# Patient Record
Sex: MALE | Race: WHITE | NOT HISPANIC OR LATINO | Employment: FULL TIME | URBAN - METROPOLITAN AREA
[De-identification: names, ages, dates, MRNs, and addresses within clinical notes are randomized per-mention and may not be internally consistent; named-entity substitution may affect disease eponyms.]

---

## 2024-04-23 ENCOUNTER — TELEPHONE (OUTPATIENT)
Dept: PRIMARY CARE | Facility: CLINIC | Age: 39
End: 2024-04-23
Payer: COMMERCIAL

## 2024-04-23 DIAGNOSIS — Z00.00 ROUTINE GENERAL MEDICAL EXAMINATION AT A HEALTH CARE FACILITY: ICD-10-CM

## 2024-04-23 NOTE — TELEPHONE ENCOUNTER
F/T CPE 04-30-24 TERA ([facility])    WILL PATIENT NEED LABS?    PATIENT CAN BE REACHED [phone]

## 2024-04-26 ENCOUNTER — LAB (OUTPATIENT)
Dept: LAB | Facility: LAB | Age: 39
End: 2024-04-26
Payer: COMMERCIAL

## 2024-04-26 DIAGNOSIS — Z00.00 ROUTINE GENERAL MEDICAL EXAMINATION AT A HEALTH CARE FACILITY: ICD-10-CM

## 2024-04-26 LAB
ALBUMIN SERPL BCP-MCNC: 4.9 G/DL (ref 3.4–5)
ALP SERPL-CCNC: 64 U/L (ref 33–120)
ALT SERPL W P-5'-P-CCNC: 21 U/L (ref 10–52)
ANION GAP SERPL CALC-SCNC: 12 MMOL/L (ref 10–20)
AST SERPL W P-5'-P-CCNC: 13 U/L (ref 9–39)
BASOPHILS # BLD AUTO: 0.06 X10*3/UL (ref 0–0.1)
BASOPHILS NFR BLD AUTO: 1.2 %
BILIRUB SERPL-MCNC: 0.7 MG/DL (ref 0–1.2)
BUN SERPL-MCNC: 18 MG/DL (ref 6–23)
CALCIUM SERPL-MCNC: 9.9 MG/DL (ref 8.6–10.3)
CHLORIDE SERPL-SCNC: 104 MMOL/L (ref 98–107)
CHOLEST SERPL-MCNC: 212 MG/DL (ref 0–199)
CHOLESTEROL/HDL RATIO: 4.6
CO2 SERPL-SCNC: 30 MMOL/L (ref 21–32)
CREAT SERPL-MCNC: 1.06 MG/DL (ref 0.5–1.3)
EGFRCR SERPLBLD CKD-EPI 2021: >90 ML/MIN/1.73M*2
EOSINOPHIL # BLD AUTO: 0.09 X10*3/UL (ref 0–0.7)
EOSINOPHIL NFR BLD AUTO: 1.9 %
ERYTHROCYTE [DISTWIDTH] IN BLOOD BY AUTOMATED COUNT: 12.9 % (ref 11.5–14.5)
GLUCOSE SERPL-MCNC: 97 MG/DL (ref 74–99)
HCT VFR BLD AUTO: 47.7 % (ref 41–52)
HDLC SERPL-MCNC: 45.6 MG/DL
HGB BLD-MCNC: 16 G/DL (ref 13.5–17.5)
IMM GRANULOCYTES # BLD AUTO: 0.01 X10*3/UL (ref 0–0.7)
IMM GRANULOCYTES NFR BLD AUTO: 0.2 % (ref 0–0.9)
LDLC SERPL CALC-MCNC: 151 MG/DL
LYMPHOCYTES # BLD AUTO: 1.27 X10*3/UL (ref 1.2–4.8)
LYMPHOCYTES NFR BLD AUTO: 26.3 %
MCH RBC QN AUTO: 28.8 PG (ref 26–34)
MCHC RBC AUTO-ENTMCNC: 33.5 G/DL (ref 32–36)
MCV RBC AUTO: 86 FL (ref 80–100)
MONOCYTES # BLD AUTO: 0.42 X10*3/UL (ref 0.1–1)
MONOCYTES NFR BLD AUTO: 8.7 %
NEUTROPHILS # BLD AUTO: 2.97 X10*3/UL (ref 1.2–7.7)
NEUTROPHILS NFR BLD AUTO: 61.7 %
NON HDL CHOLESTEROL: 166 MG/DL (ref 0–149)
NRBC BLD-RTO: 0 /100 WBCS (ref 0–0)
PLATELET # BLD AUTO: 269 X10*3/UL (ref 150–450)
POTASSIUM SERPL-SCNC: 3.9 MMOL/L (ref 3.5–5.3)
PROT SERPL-MCNC: 7.3 G/DL (ref 6.4–8.2)
RBC # BLD AUTO: 5.56 X10*6/UL (ref 4.5–5.9)
SODIUM SERPL-SCNC: 142 MMOL/L (ref 136–145)
TRIGL SERPL-MCNC: 77 MG/DL (ref 0–149)
VLDL: 15 MG/DL (ref 0–40)
WBC # BLD AUTO: 4.8 X10*3/UL (ref 4.4–11.3)

## 2024-04-26 PROCEDURE — 36415 COLL VENOUS BLD VENIPUNCTURE: CPT

## 2024-04-29 ENCOUNTER — LAB (OUTPATIENT)
Dept: LAB | Facility: LAB | Age: 39
End: 2024-04-29
Payer: COMMERCIAL

## 2024-04-29 DIAGNOSIS — Z00.00 ROUTINE GENERAL MEDICAL EXAMINATION AT A HEALTH CARE FACILITY: ICD-10-CM

## 2024-04-29 PROBLEM — H93.19 TINNITUS: Status: ACTIVE | Noted: 2024-04-29

## 2024-04-29 LAB
APPEARANCE UR: CLEAR
BILIRUB UR STRIP.AUTO-MCNC: NEGATIVE MG/DL
COLOR UR: YELLOW
GLUCOSE UR STRIP.AUTO-MCNC: NORMAL MG/DL
KETONES UR STRIP.AUTO-MCNC: NEGATIVE MG/DL
LEUKOCYTE ESTERASE UR QL STRIP.AUTO: NEGATIVE
MUCOUS THREADS #/AREA URNS AUTO: NORMAL /LPF
NITRITE UR QL STRIP.AUTO: NEGATIVE
PH UR STRIP.AUTO: 6 [PH]
PROT UR STRIP.AUTO-MCNC: NORMAL MG/DL
RBC # UR STRIP.AUTO: NEGATIVE /UL
RBC #/AREA URNS AUTO: NORMAL /HPF
SP GR UR STRIP.AUTO: 1.03
UROBILINOGEN UR STRIP.AUTO-MCNC: NORMAL MG/DL
WBC #/AREA URNS AUTO: NORMAL /HPF

## 2024-04-30 ENCOUNTER — OFFICE VISIT (OUTPATIENT)
Dept: PRIMARY CARE | Facility: CLINIC | Age: 39
End: 2024-04-30
Payer: COMMERCIAL

## 2024-04-30 VITALS
BODY MASS INDEX: 17.76 KG/M2 | OXYGEN SATURATION: 96 % | DIASTOLIC BLOOD PRESSURE: 64 MMHG | HEART RATE: 64 BPM | TEMPERATURE: 98 F | SYSTOLIC BLOOD PRESSURE: 114 MMHG | WEIGHT: 134 LBS | HEIGHT: 73 IN

## 2024-04-30 DIAGNOSIS — F41.9 ANXIETY AND DEPRESSION: Primary | ICD-10-CM

## 2024-04-30 DIAGNOSIS — F32.A ANXIETY AND DEPRESSION: Primary | ICD-10-CM

## 2024-04-30 PROCEDURE — 1036F TOBACCO NON-USER: CPT

## 2024-04-30 PROCEDURE — 99213 OFFICE O/P EST LOW 20 MIN: CPT

## 2024-04-30 ASSESSMENT — PAIN SCALES - GENERAL: PAINLEVEL: 0-NO PAIN

## 2024-04-30 ASSESSMENT — ANXIETY QUESTIONNAIRES
6. BECOMING EASILY ANNOYED OR IRRITABLE: NOT AT ALL
5. BEING SO RESTLESS THAT IT IS HARD TO SIT STILL: NOT AT ALL
GAD7 TOTAL SCORE: 11
2. NOT BEING ABLE TO STOP OR CONTROL WORRYING: NEARLY EVERY DAY
3. WORRYING TOO MUCH ABOUT DIFFERENT THINGS: NEARLY EVERY DAY
7. FEELING AFRAID AS IF SOMETHING AWFUL MIGHT HAPPEN: NOT AT ALL
4. TROUBLE RELAXING: MORE THAN HALF THE DAYS
1. FEELING NERVOUS, ANXIOUS, OR ON EDGE: NEARLY EVERY DAY
IF YOU CHECKED OFF ANY PROBLEMS ON THIS QUESTIONNAIRE, HOW DIFFICULT HAVE THESE PROBLEMS MADE IT FOR YOU TO DO YOUR WORK, TAKE CARE OF THINGS AT HOME, OR GET ALONG WITH OTHER PEOPLE: VERY DIFFICULT

## 2024-04-30 ASSESSMENT — PATIENT HEALTH QUESTIONNAIRE - PHQ9
10. IF YOU CHECKED OFF ANY PROBLEMS, HOW DIFFICULT HAVE THESE PROBLEMS MADE IT FOR YOU TO DO YOUR WORK, TAKE CARE OF THINGS AT HOME, OR GET ALONG WITH OTHER PEOPLE: VERY DIFFICULT
3. TROUBLE FALLING OR STAYING ASLEEP OR SLEEPING TOO MUCH: NOT AT ALL
9. THOUGHTS THAT YOU WOULD BE BETTER OFF DEAD, OR OF HURTING YOURSELF: NOT AT ALL
2. FEELING DOWN, DEPRESSED OR HOPELESS: SEVERAL DAYS
5. POOR APPETITE OR OVEREATING: NEARLY EVERY DAY
7. TROUBLE CONCENTRATING ON THINGS, SUCH AS READING THE NEWSPAPER OR WATCHING TELEVISION: NOT AT ALL
6. FEELING BAD ABOUT YOURSELF - OR THAT YOU ARE A FAILURE OR HAVE LET YOURSELF OR YOUR FAMILY DOWN: NOT AT ALL
SUM OF ALL RESPONSES TO PHQ9 QUESTIONS 1 AND 2: 3
SUM OF ALL RESPONSES TO PHQ QUESTIONS 1-9: 9
8. MOVING OR SPEAKING SO SLOWLY THAT OTHER PEOPLE COULD HAVE NOTICED. OR THE OPPOSITE, BEING SO FIGETY OR RESTLESS THAT YOU HAVE BEEN MOVING AROUND A LOT MORE THAN USUAL: NOT AT ALL
4. FEELING TIRED OR HAVING LITTLE ENERGY: NEARLY EVERY DAY
1. LITTLE INTEREST OR PLEASURE IN DOING THINGS: MORE THAN HALF THE DAYS

## 2024-04-30 NOTE — PROGRESS NOTES
Subjective   Patient ID: Paco Augustin is a 39 y.o. male who presents for Anxiety and Depression.    HPI   Paco presents for increased anxiety and depression which began in February related to work shift changes and work stressors. He was initially scheduled for physical exam today; however, he reports he is only here to discuss anxiety and depression.  He reports since he switched shifts at his current job he has become more depressed and anxious.  The new shift requires him to work from home so he is not have much for socialization, feels this is contributing to his symptoms. He reports that he has spoken to his HR department and  and they do not plan to change him back to his previous shift. He reports he likes his job overall and enjoyed his days on day shift working in the office with his co-worker. Now he feels depressed, hopeless, and reports even experiencing panic attacks prior to his shifts. He reports his anxiety and panic is affecting his job and he has called off work due to the stress and increased anxiety.  He works in IT as customer support. He has considered changing jobs but would rather not.       Patient Health Questionnaire-9 Score: 9      4/30/2024     1022   Over the past 2 weeks, how often have you been bothered by any of the following problems?     Unable to screen due to: --   Little interest or pleasure in doing things More than half the days   Feeling down, depressed, or hopeless Several days   Patient Health Questionnaire-2 Score 3   Over the past 2 weeks, how often have you been bothered by any of the following problems?     Trouble falling or staying asleep, or sleeping too much Not at all   Feeling tired or having little energy Nearly every day   Poor appetite or overeating Nearly every day   Feeling bad about yourself - or that you are a failure or have let yourself or your family down Not at all   Trouble concentrating on things, such as reading the newspaper  "or watching television Not at all   Moving or speaking so slowly that other people could have noticed? Or the opposite - being so fidgety or restless that you have been moving around a lot more than usual. Not at all   Thoughts that you would be better off dead or hurting yourself in some way Not at all   Patient Health Questionnaire-9 Score 9   If you checked off any problems on this questionnaire so far,     How difficult have these problems made it for you to do your work, take care of things at home, or get along with other people? Very difficult        Office Visit from 4/30/2024 in [facility] with SAVITA Polanco     4/30/2024     1023    Over the last 2 weeks, how often have you been bothered by any of the following problems?      Feeling nervous, anxious, or on edge Nearly every day    Not being able to stop or control worrying Nearly every day    Worrying too much about different things Nearly every day    Trouble relaxing More than half the days    Being so restless that it is hard to sit still Not at all    Becoming easily annoyed or irritable Not at all    Feeling afraid as if something awful might happen Not at all    MANJEET-7 Total Score 11    If you checked off any problems on this questionnaire,      How difficult have these problems made it for you to do your work, take care of things at home, or get along with other people? Very difficult      Review of Systems  All other systems have been reviewed and are negative except as noted in the HPI.       Objective   /64 (BP Location: Left arm)   Pulse 64   Temp 36.7 °C (98 °F) (Temporal)   Ht 1.854 m (6' 1\")   Wt 60.8 kg (134 lb)   SpO2 96%   BMI 17.68 kg/m²     Physical Exam  Vitals and nursing note reviewed.   Constitutional:       General: He is not in acute distress.     Appearance: Normal appearance. He is not ill-appearing.   Cardiovascular:      Rate and Rhythm: Normal rate and regular rhythm. "   Pulmonary:      Effort: Pulmonary effort is normal.      Breath sounds: Normal breath sounds.   Musculoskeletal:      Cervical back: Normal range of motion and neck supple. No tenderness.   Lymphadenopathy:      Cervical: No cervical adenopathy.   Skin:     General: Skin is warm and dry.      Capillary Refill: Capillary refill takes less than 2 seconds.   Neurological:      General: No focal deficit present.      Mental Status: He is alert and oriented to person, place, and time.      Cranial Nerves: No cranial nerve deficit.   Psychiatric:         Attention and Perception: Attention and perception normal.         Mood and Affect: Mood and affect normal.         Speech: Speech normal.         Behavior: Behavior normal. Behavior is cooperative.         Thought Content: Thought content normal. Thought content does not include homicidal or suicidal ideation. Thought content does not include homicidal or suicidal plan.         Cognition and Memory: Cognition and memory normal.         Judgment: Judgment normal.           Assessment/Plan   Problem List Items Addressed This Visit    None  Visit Diagnoses         Codes    Anxiety and depression    -  Primary  New problem, need better control  Discussed options for management of stress, anxiety and depression- particularly since this seems to be situational.   Discussed physiological causes of anxiety- although those do not seems to be the case.  Some lab work was obtained prior to visit.  CBC normal, CMP normal.  Lipids elevated. Will add Vitamin D level and TSH level to ordered.   Discussed medication options- he does not want to begin medication as he feels he should not need to take medication because of the situation.  Discussed counseling/talk therapy for further intervention- which he is agreeable to at this visit.  F41.9, F32.A    Relevant Orders    Vitamin D 25-Hydroxy,Total (for eval of Vitamin D levels)    TSH with reflex to Free T4 if abnormal    Referral to  Psychiatry

## 2024-05-02 ENCOUNTER — TELEPHONE (OUTPATIENT)
Dept: PRIMARY CARE | Facility: CLINIC | Age: 39
End: 2024-05-02
Payer: COMMERCIAL

## 2024-05-02 DIAGNOSIS — F41.9 ANXIETY AND DEPRESSION: Primary | ICD-10-CM

## 2024-05-02 DIAGNOSIS — F32.A ANXIETY AND DEPRESSION: Primary | ICD-10-CM

## 2024-05-02 RX ORDER — ESCITALOPRAM OXALATE 10 MG/1
10 TABLET ORAL DAILY
Qty: 30 TABLET | Refills: 1 | Status: SHIPPED | OUTPATIENT
Start: 2024-05-02 | End: 2024-07-01

## 2024-05-02 NOTE — TELEPHONE ENCOUNTER
Patient called [phone] he had  a CPE 4/30 had discussed starting n Lexapro he thinks, would like to try it  [facility]

## 2024-11-20 ENCOUNTER — TELEPHONE (OUTPATIENT)
Dept: PRIMARY CARE | Facility: CLINIC | Age: 39
End: 2024-11-20
Payer: COMMERCIAL

## 2024-11-20 NOTE — TELEPHONE ENCOUNTER
Patient would to switch Providers from [facility] to [facility].    Is this OK?    Patient can be reached at [phone]

## 2025-01-16 ENCOUNTER — APPOINTMENT (OUTPATIENT)
Dept: PRIMARY CARE | Facility: CLINIC | Age: 40
End: 2025-01-16
Payer: COMMERCIAL

## 2025-01-16 VITALS
BODY MASS INDEX: 18.69 KG/M2 | DIASTOLIC BLOOD PRESSURE: 98 MMHG | HEIGHT: 73 IN | SYSTOLIC BLOOD PRESSURE: 150 MMHG | HEART RATE: 97 BPM | WEIGHT: 141 LBS | TEMPERATURE: 98 F | OXYGEN SATURATION: 98 %

## 2025-01-16 DIAGNOSIS — R05.3 CHRONIC COUGH: Primary | ICD-10-CM

## 2025-01-16 PROCEDURE — 99213 OFFICE O/P EST LOW 20 MIN: CPT | Performed by: FAMILY MEDICINE

## 2025-01-16 PROCEDURE — 3008F BODY MASS INDEX DOCD: CPT | Performed by: FAMILY MEDICINE

## 2025-01-16 PROCEDURE — 1036F TOBACCO NON-USER: CPT | Performed by: FAMILY MEDICINE

## 2025-01-16 ASSESSMENT — PATIENT HEALTH QUESTIONNAIRE - PHQ9
SUM OF ALL RESPONSES TO PHQ9 QUESTIONS 1 AND 2: 0
1. LITTLE INTEREST OR PLEASURE IN DOING THINGS: NOT AT ALL
2. FEELING DOWN, DEPRESSED OR HOPELESS: NOT AT ALL

## 2025-01-16 ASSESSMENT — PAIN SCALES - GENERAL: PAINLEVEL_OUTOF10: 0-NO PAIN

## 2025-01-16 NOTE — PROGRESS NOTES
"Subjective   Patient ID: Paco Augustin is a 39 y.o. male who presents for Cough (X 1 1/2 years.  Cough seems worse in the mornings.   Not productive of any mucous/Feels like cough is triggered by different things during the day; changing environments, temperature, talking, after activities) and Fatigue (Increased fatigue over the past 3-4 months).    HPI Here with 1 1/2 years of chronic cough, non-productive and change in temperature, talking, and upon awakening.  Pt notes that he suffered two bouts of COVID and the cough began after the first bout.  He has been to an allergist and has allergy to dust among other things.  He has not begun desensitization therapy.  He uses a daily antihistamine without much relief.     Review of Systems  Constitutional: Patient is negative for fever, fatigue, weight.  HEENT: Patient is positive for some mild congestion.  He is negative for change in vision, hearing, swallow.  Cardio: Patient is negative for chest pain, lower extremity edema.  Pulmonary: Patient is positive for a chronic cough.  He is negative for shortness of breath.  Objective   BP (!) 150/98 (BP Location: Left arm, Patient Position: Sitting, BP Cuff Size: Adult)   Pulse 97   Temp 36.7 °C (98 °F)   Ht 1.854 m (6' 1\")   Wt 64 kg (141 lb)   SpO2 98%   BMI 18.60 kg/m²     Physical Exam  General: Awake and alert no apparent distress.  HEENT: Moist oral mucosa no cervical lymphadenopathy.  Cardio: Heart S1-S2 no murmur rub or gallop.  Pulmonary: Lungs clear to auscultation bilaterally  Assessment/Plan   Problem List Items Addressed This Visit    None  Visit Diagnoses         Codes    Chronic cough    -  Primary needs better control.  Patient will discontinue his antihistamine.  He will empirically begin a course of omeprazole.  He will follow-up here in 1 month.  If no better on omeprazole we will have patient consider desensitization therapy from the allergist. R05.3    Relevant Orders    Follow Up In Primary " Care - Established

## 2025-02-18 ENCOUNTER — APPOINTMENT (OUTPATIENT)
Dept: PRIMARY CARE | Facility: CLINIC | Age: 40
End: 2025-02-18
Payer: COMMERCIAL

## 2025-02-18 VITALS
TEMPERATURE: 97.2 F | HEART RATE: 83 BPM | HEIGHT: 73 IN | BODY MASS INDEX: 18.69 KG/M2 | WEIGHT: 141 LBS | DIASTOLIC BLOOD PRESSURE: 78 MMHG | SYSTOLIC BLOOD PRESSURE: 124 MMHG | OXYGEN SATURATION: 96 %

## 2025-02-18 DIAGNOSIS — R05.3 CHRONIC COUGH: ICD-10-CM

## 2025-02-18 PROCEDURE — 99213 OFFICE O/P EST LOW 20 MIN: CPT | Performed by: FAMILY MEDICINE

## 2025-02-18 PROCEDURE — 3008F BODY MASS INDEX DOCD: CPT | Performed by: FAMILY MEDICINE

## 2025-02-18 PROCEDURE — 1036F TOBACCO NON-USER: CPT | Performed by: FAMILY MEDICINE

## 2025-02-18 RX ORDER — LORATADINE 10 MG/1
10 TABLET ORAL DAILY
COMMUNITY

## 2025-02-18 ASSESSMENT — PATIENT HEALTH QUESTIONNAIRE - PHQ9
1. LITTLE INTEREST OR PLEASURE IN DOING THINGS: NOT AT ALL
SUM OF ALL RESPONSES TO PHQ9 QUESTIONS 1 AND 2: 0
2. FEELING DOWN, DEPRESSED OR HOPELESS: NOT AT ALL

## 2025-02-18 ASSESSMENT — PAIN SCALES - GENERAL: PAINLEVEL_OUTOF10: 0-NO PAIN

## 2025-02-18 NOTE — PROGRESS NOTES
"Subjective   Patient ID: Paco Augustin is a 39 y.o. male who presents for Follow-up (Chronic cough-   seen 1/16/2025 and told to use OTC Omeprazole 20mg daily and recheck in one month.  Patient used omeprazole for the first 2-3 weeks. He stopped it for the past week.  He started back on OTC allergy pill 2-3 days ago).    HPI Here for follow up cough.  Since last visit he was on omeprazole 20 mg every day, but has not noticed any change in his cough.  About a week ago he stopped omeprazole and will now try an antihistamine.  Pt feels he may have a cough-induced asthma as he is mildly SOB at times.    Review of Systems  Constitutional:  negative for fever, fatigue, weight change.  HEENT: negative for change in vision, hearing, swallow.  Cardio:  negative for chest pain, lower extremity edema.  Pulm: Patient is positive for mild shortness of breath and cough.  Objective   /78 (BP Location: Left arm)   Pulse 83   Temp 36.2 °C (97.2 °F)   Ht 1.854 m (6' 1\")   Wt 64 kg (141 lb)   SpO2 96%   BMI 18.60 kg/m²     Physical Exam  General: Awake and alert no apparent distress.  HEENT: Moist oral mucosa no cervical lymphadenopathy.  Cardio: Heart S1-S2 no murmur rub or gallop.  Pulmonary: Lungs clear to auscultation bilaterally.  Assessment/Plan   Problem List Items Addressed This Visit    None  Visit Diagnoses         Codes    Chronic cough    needs better control.  Patient will try using a stronger antihistamine, other than loratadine.  He was offered fexofenadine or cetirizine.  He will try this for about a month.  If the cough does not subside.  Will consider sending patient for pulmonary function testing. R05.3               "

## 2025-04-13 ENCOUNTER — CLINICAL SUPPORT (OUTPATIENT)
Dept: URGENT CARE | Age: 40
End: 2025-04-13
Payer: COMMERCIAL

## 2025-04-13 ENCOUNTER — HOSPITAL ENCOUNTER (EMERGENCY)
Facility: HOSPITAL | Age: 40
Discharge: HOME | End: 2025-04-13
Attending: EMERGENCY MEDICINE
Payer: COMMERCIAL

## 2025-04-13 ENCOUNTER — APPOINTMENT (OUTPATIENT)
Dept: CARDIOLOGY | Facility: HOSPITAL | Age: 40
End: 2025-04-13
Payer: COMMERCIAL

## 2025-04-13 ENCOUNTER — TELEPHONE (OUTPATIENT)
Dept: PRIMARY CARE | Facility: CLINIC | Age: 40
End: 2025-04-13
Payer: COMMERCIAL

## 2025-04-13 ENCOUNTER — APPOINTMENT (OUTPATIENT)
Dept: RADIOLOGY | Facility: HOSPITAL | Age: 40
End: 2025-04-13
Payer: COMMERCIAL

## 2025-04-13 VITALS
RESPIRATION RATE: 18 BRPM | OXYGEN SATURATION: 97 % | TEMPERATURE: 97.7 F | WEIGHT: 140 LBS | DIASTOLIC BLOOD PRESSURE: 80 MMHG | BODY MASS INDEX: 18.55 KG/M2 | HEART RATE: 93 BPM | SYSTOLIC BLOOD PRESSURE: 112 MMHG | HEIGHT: 73 IN

## 2025-04-13 VITALS
WEIGHT: 144.4 LBS | HEIGHT: 73 IN | SYSTOLIC BLOOD PRESSURE: 130 MMHG | HEART RATE: 75 BPM | TEMPERATURE: 98.4 F | DIASTOLIC BLOOD PRESSURE: 84 MMHG | OXYGEN SATURATION: 97 % | RESPIRATION RATE: 16 BRPM | BODY MASS INDEX: 19.14 KG/M2

## 2025-04-13 DIAGNOSIS — R07.9 CHEST PAIN, UNSPECIFIED TYPE: Primary | ICD-10-CM

## 2025-04-13 LAB
ALBUMIN SERPL BCP-MCNC: 4.6 G/DL (ref 3.4–5)
ALP SERPL-CCNC: 55 U/L (ref 33–120)
ALT SERPL W P-5'-P-CCNC: 27 U/L (ref 10–52)
ANION GAP SERPL CALCULATED.3IONS-SCNC: 10 MMOL/L (ref 10–20)
AST SERPL W P-5'-P-CCNC: 15 U/L (ref 9–39)
BASOPHILS # BLD AUTO: 0.04 X10*3/UL (ref 0–0.1)
BASOPHILS NFR BLD AUTO: 0.6 %
BILIRUB SERPL-MCNC: 0.5 MG/DL (ref 0–1.2)
BUN SERPL-MCNC: 18 MG/DL (ref 6–23)
CALCIUM SERPL-MCNC: 9.1 MG/DL (ref 8.6–10.3)
CARDIAC TROPONIN I PNL SERPL HS: <3 NG/L (ref 0–20)
CARDIAC TROPONIN I PNL SERPL HS: <3 NG/L (ref 0–20)
CHLORIDE SERPL-SCNC: 105 MMOL/L (ref 98–107)
CO2 SERPL-SCNC: 28 MMOL/L (ref 21–32)
CREAT SERPL-MCNC: 1 MG/DL (ref 0.5–1.3)
EGFRCR SERPLBLD CKD-EPI 2021: >90 ML/MIN/1.73M*2
EOSINOPHIL # BLD AUTO: 0.05 X10*3/UL (ref 0–0.7)
EOSINOPHIL NFR BLD AUTO: 0.8 %
ERYTHROCYTE [DISTWIDTH] IN BLOOD BY AUTOMATED COUNT: 12.8 % (ref 11.5–14.5)
GLUCOSE SERPL-MCNC: 100 MG/DL (ref 74–99)
HCT VFR BLD AUTO: 45.3 % (ref 41–52)
HGB BLD-MCNC: 15.2 G/DL (ref 13.5–17.5)
IMM GRANULOCYTES # BLD AUTO: 0.02 X10*3/UL (ref 0–0.7)
IMM GRANULOCYTES NFR BLD AUTO: 0.3 % (ref 0–0.9)
LYMPHOCYTES # BLD AUTO: 1.01 X10*3/UL (ref 1.2–4.8)
LYMPHOCYTES NFR BLD AUTO: 15.8 %
MAGNESIUM SERPL-MCNC: 1.87 MG/DL (ref 1.6–2.4)
MCH RBC QN AUTO: 28.4 PG (ref 26–34)
MCHC RBC AUTO-ENTMCNC: 33.6 G/DL (ref 32–36)
MCV RBC AUTO: 85 FL (ref 80–100)
MONOCYTES # BLD AUTO: 0.54 X10*3/UL (ref 0.1–1)
MONOCYTES NFR BLD AUTO: 8.4 %
NEUTROPHILS # BLD AUTO: 4.74 X10*3/UL (ref 1.2–7.7)
NEUTROPHILS NFR BLD AUTO: 74.1 %
NRBC BLD-RTO: 0 /100 WBCS (ref 0–0)
PLATELET # BLD AUTO: 234 X10*3/UL (ref 150–450)
POTASSIUM SERPL-SCNC: 4.1 MMOL/L (ref 3.5–5.3)
PROT SERPL-MCNC: 7 G/DL (ref 6.4–8.2)
RBC # BLD AUTO: 5.35 X10*6/UL (ref 4.5–5.9)
SODIUM SERPL-SCNC: 139 MMOL/L (ref 136–145)
WBC # BLD AUTO: 6.4 X10*3/UL (ref 4.4–11.3)

## 2025-04-13 PROCEDURE — 71046 X-RAY EXAM CHEST 2 VIEWS: CPT

## 2025-04-13 PROCEDURE — 71046 X-RAY EXAM CHEST 2 VIEWS: CPT | Performed by: RADIOLOGY

## 2025-04-13 PROCEDURE — 80053 COMPREHEN METABOLIC PANEL: CPT | Performed by: EMERGENCY MEDICINE

## 2025-04-13 PROCEDURE — 2500000001 HC RX 250 WO HCPCS SELF ADMINISTERED DRUGS (ALT 637 FOR MEDICARE OP): Performed by: EMERGENCY MEDICINE

## 2025-04-13 PROCEDURE — 83735 ASSAY OF MAGNESIUM: CPT | Performed by: EMERGENCY MEDICINE

## 2025-04-13 PROCEDURE — 93000 ELECTROCARDIOGRAM COMPLETE: CPT

## 2025-04-13 PROCEDURE — 85025 COMPLETE CBC W/AUTO DIFF WBC: CPT | Performed by: EMERGENCY MEDICINE

## 2025-04-13 PROCEDURE — 36415 COLL VENOUS BLD VENIPUNCTURE: CPT | Performed by: EMERGENCY MEDICINE

## 2025-04-13 PROCEDURE — 84484 ASSAY OF TROPONIN QUANT: CPT | Performed by: EMERGENCY MEDICINE

## 2025-04-13 PROCEDURE — 99285 EMERGENCY DEPT VISIT HI MDM: CPT | Mod: 25 | Performed by: EMERGENCY MEDICINE

## 2025-04-13 PROCEDURE — 93005 ELECTROCARDIOGRAM TRACING: CPT

## 2025-04-13 PROCEDURE — 3008F BODY MASS INDEX DOCD: CPT

## 2025-04-13 PROCEDURE — 99204 OFFICE O/P NEW MOD 45 MIN: CPT

## 2025-04-13 RX ORDER — NAPROXEN SODIUM 220 MG/1
324 TABLET, FILM COATED ORAL ONCE
Status: COMPLETED | OUTPATIENT
Start: 2025-04-13 | End: 2025-04-13

## 2025-04-13 RX ADMIN — ASPIRIN 81 MG CHEWABLE TABLET 324 MG: 81 TABLET CHEWABLE at 12:02

## 2025-04-13 ASSESSMENT — ENCOUNTER SYMPTOMS
DIZZINESS: 0
CHILLS: 0
VOMITING: 0
SINUS PRESSURE: 0
SORE THROAT: 0
EYE ITCHING: 0
CHEST TIGHTNESS: 0
FATIGUE: 0
FEVER: 0
SHORTNESS OF BREATH: 0
DIARRHEA: 0
ABDOMINAL PAIN: 0
EYE DISCHARGE: 0

## 2025-04-13 ASSESSMENT — HEART SCORE
TROPONIN: LESS THAN OR EQUAL TO NORMAL LIMIT
AGE: <45
ECG: NORMAL
RISK FACTORS: NO KNOWN RISK FACTORS
HISTORY: SLIGHTLY SUSPICIOUS
HEART SCORE: 0

## 2025-04-13 ASSESSMENT — COLUMBIA-SUICIDE SEVERITY RATING SCALE - C-SSRS
1. IN THE PAST MONTH, HAVE YOU WISHED YOU WERE DEAD OR WISHED YOU COULD GO TO SLEEP AND NOT WAKE UP?: NO
6. HAVE YOU EVER DONE ANYTHING, STARTED TO DO ANYTHING, OR PREPARED TO DO ANYTHING TO END YOUR LIFE?: NO
2. HAVE YOU ACTUALLY HAD ANY THOUGHTS OF KILLING YOURSELF?: NO
2. HAVE YOU ACTUALLY HAD ANY THOUGHTS OF KILLING YOURSELF?: NO
1. IN THE PAST MONTH, HAVE YOU WISHED YOU WERE DEAD OR WISHED YOU COULD GO TO SLEEP AND NOT WAKE UP?: NO
6. HAVE YOU EVER DONE ANYTHING, STARTED TO DO ANYTHING, OR PREPARED TO DO ANYTHING TO END YOUR LIFE?: NO

## 2025-04-13 ASSESSMENT — PAIN SCALES - GENERAL
PAINLEVEL_OUTOF10: 2
PAINLEVEL_OUTOF10: 3

## 2025-04-13 ASSESSMENT — PAIN - FUNCTIONAL ASSESSMENT: PAIN_FUNCTIONAL_ASSESSMENT: 0-10

## 2025-04-13 NOTE — ED PROVIDER NOTES
The patient was seen in conjunction with the advanced practice provider, and I performed a substantive portion of the visit. I personally saw the patient and made/approved the management plan and take responsibility for the patient management. All medical decision making was performed by me. All laboratory studies, radiology, and EKGs were reviewed by me.     History: 40-year-old male presents for midsternal chest pain that started last night.  Somewhat worse with certain movements and states he feels there is some tenderness on his chest as well/soreness.  No cardiac history.  He has had history of pericarditis in the past and was concerned for same.  No recent viral or ill symptoms.  No leg pain or swelling.  No history of DVT or PE.  No ripping or tearing pain.  No radiation to the back or abdomen.  Physical exam:  Constitutional:  Awake, alert, well appearing, nontoxic  HEENT:  Normocephalic, atraumatic  Neck: Trachea midline, no stridor  Respiratory/Chest:  Clear to auscultation bilaterally, no wheezes, rhonchi, or rales, tenderness palpation along the right parasternal border that reproduces patient's symptoms, no crepitus, no rashes  CV:  Regular rate and regular rhythm, no murmurs, gallops, or rubs, 2+ symmetrical bilateral radial pulses  Neuro: A/O, normal speech  Extremities: No peripheral edema, calf tenderness or palpable cords  Skin:  Warm and dry    MDM: Patient presents for chest pain.  History obtained from the patient. Based on patient history, exam, and workup I estimate there is a low risk for diagnostic considerations including, but not limited to pulmonary embolism (PERC negative), aortic dissection, pneumothorax.  Given history of pericarditis this was considered although no significant risk factor to suggest this etiology at this time EKG nondiagnostic of this pathology but no linh SD depressions or ST elevation.  ACS also considered, however patient has nondiagnostic EKG, troponin negative x  2.  Chest x-ray on my independent interpretation and comfirmed by Radiology with no acute cardiopulmonary process.  I have considered admission/hospitalization for further workup and management of acute coronary syndrome patient has  low risk heart score of 0 thus I considered the discharge disposition reasonable.  Patient was treated with improvement.  At this time appropriate for outpatient management.  Musculoskeletal although cause unclear.    Suspected diagnosis and risks discussed with the patient including the utility of the Heart score, and after shared decision-making discussion agreed with discharging home to follow up closely with primary care doctor or cardiologist.  Also discussed return instructions including the symptoms which are most concerning including difficulty breathing, exertional chest pain associated with SOB, nausea, or diaphoresis, and changing or worsening symptoms that would necessitate immediate return.     ED Course as of 04/13/25 1727   Sun Apr 13, 2025   1143 EKG on my independent interpretation: Normal sinus rhythm 76 bpm, rightward axis, normal intervals, no acute ST or T wave abnormalities, no prior EKG for comparison [WILLIE]      ED Course User Index  [WILLIE] Thalia Santos MD         Diagnoses as of 04/13/25 1727   Chest pain, unspecified type            Thalia Santos MD  04/13/25 1727

## 2025-04-13 NOTE — DISCHARGE INSTRUCTIONS
Please rotate Tylenol and ibuprofen for pain relief every 4-6 hours and follow-up with your primary care provider for reassessment of symptoms.  Refer to the chest pain discharge instructions attached.  It is suspected that your pain is more so musculoskeletal in nature but please continue to monitor your symptoms closely and return with any worsening or new onset of symptoms such as severe chest pain, change in chest pain or development of shortness of breath.    It is important to remember that your care does not end here and you must continue to monitor your condition closely. Please return to the emergency department for any worsening or concerning signs or symptoms as directed by our conversations and the discharge instructions. If you do not have a doctor please contact the referral number on your discharge instructions. Please contact any physician specialists provided in your discharge notes as it is very important to follow up with them regarding your condition. If you are unable to reach the physicians provided, please come back to the Emergency Department at any time.

## 2025-04-13 NOTE — PROGRESS NOTES
Subjective   Patient ID: Paco Augustin is a 40 y.o. male. They present today with a chief complaint of Chest Pain (Chest tightness x10 hours).    History of Present Illness  Patient is a 40-year-old male presenting to the clinic with chest pain.  Patient is a prior history of pericarditis.  Patient states chest pain has been present for the last 10 hours.  Patient states chest pain is midsternal worse with deep breathing.  Patient denies any smoking.  Patient denies any shortness of breath.  Patient denies any runny nose, sore throat, ear pain or ear drainage.  No other acute ROS at this time.  Patient states chest pain has been present for 10 hours straight worse with deep breathing.      History provided by:  Patient  Chest Pain  Associated symptoms: no abdominal pain, no dizziness, no fatigue, no fever, no shortness of breath and no vomiting        Past Medical History  Allergies as of 04/13/2025 - Reviewed 04/13/2025   Allergen Reaction Noted    Sulfamethoxazole-trimethoprim Anaphylaxis and Myalgia 02/12/2022    Dog dander Unknown 04/29/2024    House dust mite Unknown 04/29/2024    Weed pollen-short ragweed Unknown 04/29/2024    Mold Rash 04/29/2024       (Not in a hospital admission)       History reviewed. No pertinent past medical history.    History reviewed. No pertinent surgical history.     reports that he has never smoked. He has never used smokeless tobacco. He reports that he does not drink alcohol and does not use drugs.    Review of Systems  Review of Systems   Constitutional:  Negative for chills, fatigue and fever.   HENT:  Negative for congestion, ear discharge, ear pain, postnasal drip, sinus pressure and sore throat.    Eyes:  Negative for discharge and itching.   Respiratory:  Negative for chest tightness and shortness of breath.    Cardiovascular:  Positive for chest pain.   Gastrointestinal:  Negative for abdominal pain, diarrhea and vomiting.   Neurological:  Negative for dizziness.  "                                 Objective    Vitals:    04/13/25 1027   BP: 112/80   BP Location: Left arm   Patient Position: Sitting   BP Cuff Size: Adult   Pulse: 93   Resp: 18   Temp: 36.5 °C (97.7 °F)   TempSrc: Oral   SpO2: 97%   Weight: 63.5 kg (140 lb)   Height: 1.854 m (6' 1\")     No LMP for male patient.    Physical Exam  Vitals reviewed.   Constitutional:       General: He is not in acute distress.     Appearance: Normal appearance. He is normal weight. He is not ill-appearing, toxic-appearing or diaphoretic.   HENT:      Head: Normocephalic and atraumatic.      Mouth/Throat:      Mouth: Mucous membranes are moist.   Eyes:      Conjunctiva/sclera: Conjunctivae normal.   Cardiovascular:      Rate and Rhythm: Normal rate and regular rhythm.      Heart sounds: Normal heart sounds. No murmur heard.     No friction rub. No gallop.   Pulmonary:      Effort: No respiratory distress.      Breath sounds: Normal breath sounds. No stridor. No wheezing, rhonchi or rales.   Neurological:      General: No focal deficit present.      Mental Status: He is alert and oriented to person, place, and time.         Procedures    Point of Care Test & Imaging Results from this visit  No results found for this visit on 04/13/25.   Imaging  No results found.    Cardiology, Vascular, and Other Imaging  No other imaging results found for the past 2 days      Diagnostic study results (if any) were reviewed by [facility].    Assessment/Plan   Allergies, medications, history, and pertinent labs/EKGs/Imaging reviewed by Isaac Burns PA-C.     Medical Decision Making:    Patient is a 40-year-old male presenting to the clinic with chest pain.  Patient is a prior history of pericarditis.  Patient states chest pain has been present for the last 10 hours.  Patient states chest pain is midsternal worse with deep breathing.  Patient denies any smoking.  Patient denies any shortness of breath.  Patient denies any runny nose, sore " throat, ear pain or ear drainage.  No other acute ROS at this time.  Patient states chest pain has been present for 10 hours straight worse with deep breathing.  Vital signs in the clinic are stable and within normal limits.  EKG: DC interval 136 ms.  QRS 90 ms.  QTc 418 ms.  Heart rate 90 bpm.  Sinus rhythm rapid.  Anterior septal infarct.  Abnormal ECG.  Unconfirmed report.  Attending physician reviewed EKG.  She does not see any signs of acute ST segment elevations.  Attending physician consulted on patient case.  Attending physician okay with prior to transfer to emergency room for reevaluation and treatment.    Orders and Diagnoses  Diagnoses and all orders for this visit:  Chest pain, unspecified type  -     ECG 12 Lead      Medical Admin Record      Patient disposition: ED    Electronically signed by [facility]  10:34 AM

## 2025-04-13 NOTE — PATIENT INSTRUCTIONS
I am recommending you go to the emergency room for reevaluation of chest pain.  Patient will be going to nearest emergency room.  [facility] will [facility].  They will be called and given signout.  Patient educated on risks and benefits of his symptoms including risk including pericarditis, underlying heart disease, or other unknown etiology that can lead to chest pain.  Patient understands risks and benefits and will drive directly to the emergency room.  Attending physician okay with private vehicle transfer.  Patient is not complaining of any dizziness or presyncope

## 2025-04-13 NOTE — Clinical Note
Paco Augustin was seen and treated in our emergency department on 4/13/2025.  He may return to work on 04/14/2025.       If you have any questions or concerns, please don't hesitate to call.      Sharon Cristobal PA-C

## 2025-04-13 NOTE — PROGRESS NOTES
Received call to on-call line for patient. Patient with history of pericarditis. Having chest pain since 10 PM last night. Advised emergency department for evaluation. Patient expressed understanding and stated that he would proceed to ER.

## 2025-04-13 NOTE — ED PROVIDER NOTES
HPI   Chief Complaint   Patient presents with    Chest Pain     Pt complains of midsternal chest pain starting last night.  Gets worse with a deep breath or movement.        HPI  40-year-old male presents for evaluation of midsternal chest pain that started last night.  States the pain does not radiate.  Denies ripping or tearing pain.  No history of DVT or PE.  Denies history of CAD.  No recent illness or shortness of breath associated with the symptoms.  No diaphoresis or palpitations.  He states he does have history of pericarditis and was concerned his symptoms were similar.  No recent fevers chills or viral symptoms.      Patient History   History reviewed. No pertinent past medical history.  History reviewed. No pertinent surgical history.  Family History   Problem Relation Name Age of Onset    Hypertension Mother      Hyperlipidemia Mother      Diabetes Mother       Social History     Tobacco Use    Smoking status: Never    Smokeless tobacco: Never   Substance Use Topics    Alcohol use: Never    Drug use: Never       Physical Exam   ED Triage Vitals [04/13/25 1144]   Temperature Heart Rate Respirations BP   36.9 °C (98.4 °F) 77 18 125/83      Pulse Ox Temp src Heart Rate Source Patient Position   97 % -- -- --      BP Location FiO2 (%)     -- --       Physical Exam  Vitals and nursing note reviewed.   Constitutional:       General: He is not in acute distress.     Appearance: He is well-developed.      Comments: Nontoxic-appearing resting in exam bed   HENT:      Head: Normocephalic and atraumatic.   Eyes:      Conjunctiva/sclera: Conjunctivae normal.   Cardiovascular:      Rate and Rhythm: Normal rate and regular rhythm.      Heart sounds: No murmur heard.  Pulmonary:      Effort: Pulmonary effort is normal. No respiratory distress.      Breath sounds: Normal breath sounds.   Chest:      Comments: Substernal and right substernal chest wall tenderness to palpation  Abdominal:      Palpations: Abdomen is soft.       Tenderness: There is no abdominal tenderness.   Musculoskeletal:         General: No swelling.      Cervical back: Neck supple.   Skin:     General: Skin is warm and dry.      Capillary Refill: Capillary refill takes less than 2 seconds.   Neurological:      Mental Status: He is alert.   Psychiatric:         Mood and Affect: Mood normal.           ED Course & MDM   ED Course as of 04/15/25 2241   Sun Apr 13, 2025   1143 EKG on my independent interpretation: Normal sinus rhythm 76 bpm, rightward axis, normal intervals, no acute ST or T wave abnormalities, no prior EKG for comparison [WILLIE]      ED Course User Index  [WILLIE] Thalia Santos MD         Diagnoses as of 04/15/25 2241   Chest pain, unspecified type                 No data recorded     [place] Coma Scale Score: 15 (04/13/25 1149 : Savannah Payne RN) HEART Score: 0 (04/13/25 1250 : Thalia Santos MD)                         Medical Decision Making  Parts of this chart have been completed using voice recognition software. Please excuse any errors of transcription.  My thought process and reason for plan has been formulated from the time that I saw the patient until the time of disposition and is not specific to one specific moment during their visit and furthermore my MDM encompasses this entire chart and not only this text box.      HPI: Detailed above.    Exam: A medically appropriate exam performed, outlined above, given the known history and presentation.    History obtained from: Patient    EKG: Interpreted by attending physician and reviewed by me    Social Determinants of Health considered during this visit: Lives independently    Medications given during visit:  Medications   aspirin chewable tablet 324 mg (324 mg oral Given 4/13/25 1202)        Diagnostic/tests  Labs Reviewed   CBC WITH AUTO DIFFERENTIAL - Abnormal       Result Value    WBC 6.4      nRBC 0.0      RBC 5.35      Hemoglobin 15.2      Hematocrit 45.3      MCV 85      MCH 28.4       MCHC 33.6      RDW 12.8      Platelets 234      Neutrophils % 74.1      Immature Granulocytes %, Automated 0.3      Lymphocytes % 15.8      Monocytes % 8.4      Eosinophils % 0.8      Basophils % 0.6      Neutrophils Absolute 4.74      Immature Granulocytes Absolute, Automated 0.02      Lymphocytes Absolute 1.01 (*)     Monocytes Absolute 0.54      Eosinophils Absolute 0.05      Basophils Absolute 0.04     COMPREHENSIVE METABOLIC PANEL - Abnormal    Glucose 100 (*)     Sodium 139      Potassium 4.1      Chloride 105      Bicarbonate 28      Anion Gap 10      Urea Nitrogen 18      Creatinine 1.00      eGFR >90      Calcium 9.1      Albumin 4.6      Alkaline Phosphatase 55      Total Protein 7.0      AST 15      Bilirubin, Total 0.5      ALT 27     MAGNESIUM - Normal    Magnesium 1.87     SERIAL TROPONIN-INITIAL - Normal    Troponin I, High Sensitivity <3      Narrative:     Less than 99th percentile of normal range cutoff-  Female and children under 18 years old <14 ng/L; Male <21 ng/L: Negative  Repeat testing should be performed if clinically indicated.     Female and children under 18 years old 14-50 ng/L; Male 21-50 ng/L:  Consistent with possible cardiac damage and possible increased clinical   risk. Serial measurements may help to assess extent of myocardial damage.     >50 ng/L: Consistent with cardiac damage, increased clinical risk and  myocardial infarction. Serial measurements may help assess extent of   myocardial damage.      NOTE: Children less than 1 year old may have higher baseline troponin   levels and results should be interpreted in conjunction with the overall   clinical context.     NOTE: Troponin I testing is performed using a different   testing methodology at [facility] than at other   [facility]. Direct result comparisons should only   be made within the same method.   SERIAL TROPONIN, 1 HOUR - Normal    Troponin I, High Sensitivity <3      Narrative:     Less than  99th percentile of normal range cutoff-  Female and children under 18 years old <14 ng/L; Male <21 ng/L: Negative  Repeat testing should be performed if clinically indicated.     Female and children under 18 years old 14-50 ng/L; Male 21-50 ng/L:  Consistent with possible cardiac damage and possible increased clinical   risk. Serial measurements may help to assess extent of myocardial damage.     >50 ng/L: Consistent with cardiac damage, increased clinical risk and  myocardial infarction. Serial measurements may help assess extent of   myocardial damage.      NOTE: Children less than 1 year old may have higher baseline troponin   levels and results should be interpreted in conjunction with the overall   clinical context.     NOTE: Troponin I testing is performed using a different   testing methodology at [facility] than at other   [facility]. Direct result comparisons should only   be made within the same method.   TROPONIN SERIES- (INITIAL, 1 HR)    Narrative:     The following orders were created for panel order Troponin I Series, High Sensitivity (0, 1 HR).  Procedure                               Abnormality         Status                     ---------                               -----------         ------                     Troponin I, High Sensiti...[[id number]]  Normal              Final result               Troponin, High Sensitivi...[[id number]]  Normal              Final result                 Please view results for these tests on the individual orders.      XR chest 2 views   Final Result   Left basilar atelectasis.        MACRO:   None        Signed by: Dwight Hay 4/13/2025 12:16 PM   Dictation workstation:   [id number]           Considerations/further MDM:  Patient is a 40-year-old male presenting for evaluation of substernal chest pain      I saw this patient in conjunction with Dr. Santos    Patient awake alert nontoxic-appearing vital signs within normal limits.  EKG  "without evidence of acute ischemia workup unremarkable cardiac enzyme negative.  No ripping or tearing chest pain history not suggestive of aortic dissection.  Patient felt to be at low risk for pulmonary embolism based on PERC score.  X-ray without evidence of acute cardiopulmonary process such as pneumonia pneumothorax or findings consistent with CHF.  ACS considered but patient with nondiagnostic EKG and negative cardiac enzymes.  Patient was treated symptomatically with some improvement in symptoms he is felt to be at low risk for major adverse cardiac events based on heart score and felt to be stable for outpatient management.  I discussed the laboratory and imaging findings with the patient at bedside. Patient's questions and concerns were addressed. Patient was released in good condition, discharged with instructions to follow up with primary care provider and appropriate specialist, and to return to ED at any time for worsening symptoms or any other concerns. Patient demonstrates understanding of the findings and the importance of appropriate follow up care.   I utilized an evidence-based risk rating tool (CMT) along with my training and experience to weigh the risk of discharge against the risks of further testing, imaging, or hospitalization. At this time I estimate the risks of additional testing, imaging, or hospitalization to be equal to or greater than the risk of discharge. I discussed my risk assessment with the patient and the patient consents to the risk of discharge as well as the risk of uncertainty in estimating outcomes.    The patient's HEART Score is <4. In rare cases, I give patients with HEART Score of 4 the option of discharge, but only when they meet criteria for \"Low 4,\" meaning that HST was used, and the 4 is not from a highly suspicious story, highly suspicious EKG, or positive cardiac enzymes. In these selected cases, the risk of a \"Low 4\" is still most likely lower than the risk of " admission and further testing/imaging. [id number]              Procedure  Procedures     Sharon Cristobal PA-C  04/15/25 7859

## 2025-04-14 LAB
ATRIAL RATE: 76 BPM
P AXIS: 85 DEGREES
P OFFSET: 204 MS
P ONSET: 151 MS
PR INTERVAL: 144 MS
Q ONSET: 223 MS
QRS COUNT: 12 BEATS
QRS DURATION: 88 MS
QT INTERVAL: 356 MS
QTC CALCULATION(BAZETT): 400 MS
QTC FREDERICIA: 385 MS
R AXIS: 93 DEGREES
T AXIS: 58 DEGREES
T OFFSET: 401 MS
VENTRICULAR RATE: 76 BPM

## 2025-05-01 ENCOUNTER — TELEPHONE (OUTPATIENT)
Dept: PRIMARY CARE | Facility: CLINIC | Age: 40
End: 2025-05-01
Payer: COMMERCIAL

## 2025-05-01 NOTE — TELEPHONE ENCOUNTER
pt is on the phone since Friday he has had stomach pain below his navel dull not constant couple hours after he eats was better but ate tacos last night and became worse, he took Omeprazole which helped no fever, nausea or vomiting ok to be seen ?      TYLER Rodriguez MD  yes should be seen today or tomorrow      MM  pt informed scheduled for 5/2 per pt request

## 2025-05-02 ENCOUNTER — OFFICE VISIT (OUTPATIENT)
Dept: PRIMARY CARE | Facility: CLINIC | Age: 40
End: 2025-05-02
Payer: COMMERCIAL

## 2025-05-02 VITALS
WEIGHT: 142.69 LBS | HEART RATE: 88 BPM | BODY MASS INDEX: 18.83 KG/M2 | SYSTOLIC BLOOD PRESSURE: 110 MMHG | TEMPERATURE: 97.3 F | OXYGEN SATURATION: 96 % | DIASTOLIC BLOOD PRESSURE: 80 MMHG

## 2025-05-02 DIAGNOSIS — K21.9 GASTROESOPHAGEAL REFLUX DISEASE, UNSPECIFIED WHETHER ESOPHAGITIS PRESENT: Primary | ICD-10-CM

## 2025-05-02 PROCEDURE — 1036F TOBACCO NON-USER: CPT

## 2025-05-02 PROCEDURE — 99213 OFFICE O/P EST LOW 20 MIN: CPT

## 2025-05-02 RX ORDER — FAMOTIDINE 10 MG/1
10 TABLET ORAL 2 TIMES DAILY
Qty: 60 TABLET | Refills: 0 | Status: SHIPPED | OUTPATIENT
Start: 2025-05-02 | End: 2025-06-01

## 2025-05-02 ASSESSMENT — PAIN SCALES - GENERAL: PAINLEVEL_OUTOF10: 2

## 2025-05-02 ASSESSMENT — PATIENT HEALTH QUESTIONNAIRE - PHQ9
2. FEELING DOWN, DEPRESSED OR HOPELESS: NOT AT ALL
SUM OF ALL RESPONSES TO PHQ9 QUESTIONS 1 AND 2: 0
1. LITTLE INTEREST OR PLEASURE IN DOING THINGS: NOT AT ALL

## 2025-05-02 NOTE — PROGRESS NOTES
Subjective   Patient ID: Paco Augustin is a 40 y.o. male who presents for Abdominal Pain (Pt has been noticing stomach pain above his navel after eating tacos on multiple occasions beginning on 4/26/2025. Pt states he got some relief after taking omeprazole and wants to determine if this pain is due to acid reflux or an ulcer.).    HPI   Paco is seen for stomach pain. Noticed pain 1 week ago after eating street tacos. Improved in the morning, ate leftovers that night and pain came back. Took some Peptol Bismol with some relief. Resolved Tuesday. Wednesday night had hamburger helper taco mix and woke up Thursday morning with discomfort. Had some burning in chest last night. Started taking Omeprazole which improved pain. 1/10. Not worse when lying down. Takes NSAIDs occasionally for headache, 1-2x/week. Denies fevers, chills, nausea, vomiting, dizziness, lightheadedness, blood in stool, urinary symptoms.     Review of Systems  All other systems have been reviewed and are negative except as noted in the HPI.     Objective   /80   Pulse 88   Temp 36.3 °C (97.3 °F) (Temporal)   Wt 64.7 kg (142 lb 11 oz)   SpO2 96%   BMI 18.83 kg/m²     Physical Exam  Vitals and nursing note reviewed.   Constitutional:       General: He is not in acute distress.  Eyes:      Extraocular Movements: Extraocular movements intact.      Conjunctiva/sclera: Conjunctivae normal.   Cardiovascular:      Rate and Rhythm: Normal rate.   Pulmonary:      Effort: Pulmonary effort is normal.   Abdominal:      General: Abdomen is flat. Bowel sounds are normal.      Palpations: Abdomen is soft.      Tenderness: There is no abdominal tenderness. There is no right CVA tenderness or left CVA tenderness.   Musculoskeletal:      Cervical back: Neck supple.   Neurological:      General: No focal deficit present.      Mental Status: He is alert.   Psychiatric:         Mood and Affect: Mood normal.         Assessment/Plan   Assessment &  Plan  Gastroesophageal reflux disease, unspecified whether esophagitis present  Acute. Symptoms consistent with GERD, gastritis. Low suspicion PUD. Differential dx includes early stages appendicitis, low suspicion.   We discussed continued daily omeprazole versus Pepcid due to history of chronic cough and allergy symptoms.  Patient states he will think about which medication he would like to take.  He was provided with paper prescription for Pepcid 10 mg twice daily, he states he already has a prescription for omeprazole.  He should take whichever medication he chooses for 4 weeks.  If no improvement within 2 weeks he should follow-up.  He should follow-up sooner if symptoms worsen.  We discussed he may use Pepcid on an as-needed basis going forward for reflux symptoms.  Avoid food triggers and remain upright for at least 30 minutes after eating.     Orders:    famotidine (Pepcid) 10 mg tablet; Take 1 tablet (10 mg) by mouth 2 times a day.